# Patient Record
Sex: FEMALE | Race: WHITE | Employment: UNEMPLOYED | URBAN - METROPOLITAN AREA
[De-identification: names, ages, dates, MRNs, and addresses within clinical notes are randomized per-mention and may not be internally consistent; named-entity substitution may affect disease eponyms.]

---
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
## 2025-04-17 NOTE — PROGRESS NOTES
[facility] Initial Evaluation  Subjective:  The history is provided by the patient and a parent.   Filemon Gardner is a 18 year old female with a left knee condition.  Condition occurred with:  Other reason.  Condition occurred: at home.  This is a chronic condition     Patient reports pain:  In the joint, posterior and anterior.    Pain is described as sharp and stabbing and is constant and reported as 4/10 and 8/10.  Associated symptoms:  Loss of motion/stiffness and loss of strength. Pain is the same all the time.  Symptoms are exacerbated by standing, walking and running and relieved by rest.  Since onset symptoms are gradually worsening.    Previous treatment includes physical therapy.  There was mild improvement following previous treatment.    Pertinent medical history includes:  Depression and mental illness.      Current medications:  Anti-depressants.  Current occupation is Student .        Barriers include:  None as reported by the patient.    Red flags:  None as reported by the patient.                        Objective:  System         Lumbar/SI Evaluation                      SI joint/Sacrum:    Bilat SI dysf, Rt>left,     Left positive at:    [employer]ed[employer]  Right positive at:    [employer]  Sacral conclusion left:  Inflare  Sacral conclusion right:  Posterior inominate, locked, upslip and outflare                                  Hip Evaluation      Hip Special Testing:   Special tests hip not assessed: hypermobile hips bilat   Left hip positive for the following special tests:  Debra's  Left hip negative for the following special tests:  Francia  Right hip negative for the following special tests:  Francia or Debra's    Hip Palpation:    Left hip tenderness present at:   Greater Trachanter; IT Band; Piriformis; Abductors; Gluteus Medius and Bursa  Right hip tenderness present at:  Abductors and Gluteus Medius       Knee Evaluation:  ROM:  Strength:   Normal  AROM    Hyperextension:  Left:  3    Right: 3      PROM    Hyperextension: Left: 3    Right:  3            Ligament Testing:    Varus 0:  Left:  Gr I and Neg   Right:  Gr I and Neg  Varus 30:  Left:  Gr I and Neg  Right:  Gr I and Neg      Anterior Drawer:  Left:  Gr I and Neg    Right:  Gr I and Neg  Posterior Drawer: Left:  Neg  Right:  Neg    Special Tests:   Left knee positive for the following special tests:  Debra's and IT Band Friction  Left knee negative for the following special tests:  Patellar compression    Right knee negative for the following special tests:  Patellar Compression; Debra's and IT Band Friction  Palpation:  Palpation of knee: infrapatellar bursae left.  Left knee tenderness present at:  Patellar Tendon; IT Band and Patellar Inferior              General     ROS    Assessment/Plan:    Patient is a 18 year old female with sacral, left side hip and left side knee complaints.    Patient has the following significant findings with corresponding treatment plan.                Diagnosis 1:  Patellar femoral pain left knee   Pain -  hot/cold therapy, US, manual therapy, splint/taping/bracing/orthotics and home program  Inflammation - cold therapy and US  Impaired muscle performance - neuro re-education and home program  Decreased function - therapeutic activities and home program  Instability -  Therapeutic Activity  Therapeutic Exercise  home program  Diagnosis 2:  ITband syndrome/SI dysfunction bilat    Pain -  hot/cold therapy, US, manual therapy, splint/taping/bracing/orthotics, self management and home program  Inflammation - cold therapy and US  Impaired muscle performance - neuro re-education and home program  Decreased function - therapeutic activities and home program  Instability -  Therapeutic Activity  Therapeutic Exercise  home program  Therapy Evaluation Codes:   1) History comprised of:   Personal factors that impact the plan of care:      Age, Coping style, Overall behavior  pattern and Past/current experiences.    Comorbidity factors that impact the plan of care are:      Depression and Mental illness.     Medications impacting care: Anti-depressant and Pain.  2) Examination of Body Systems comprised of:   Body structures and functions that impact the plan of care:      Hip, Knee and Sacral illiac joint.   Activity limitations that impact the plan of care are:      Running, Sports, Squatting/kneeling, Stairs, Standing and Walking.  3) Clinical presentation characteristics are:   Stable/Uncomplicated.  4) Decision-Making    Low complexity using standardized patient assessment instrument and/or measureable assessment of functional outcome.  Cumulative Therapy Evaluation is: Low complexity.    Previous and current functional limitations:  (See Goal Flow Sheet for this information)    Short term and Long term goals: (See Goal Flow Sheet for this information)     Communication ability:  Patient appears to be able to clearly communicate and understand verbal and written communication and follow directions correctly.  Treatment Explanation - The following has been discussed with the patient:   RX ordered/plan of care  Anticipated outcomes  Possible risks and side effects  This patient would benefit from PT intervention to resume normal activities.   Rehab potential is good.    Frequency:  1 X week, once daily  Duration:  for 8 weeks  Discharge Plan:  Achieve all LTG.  Independent in home treatment program.  Reach maximal therapeutic benefit.    Please refer to the daily flowsheet for treatment today, total treatment time and time spent performing 1:1 timed codes.        [Initial] : an initial consultation for [FreeTextEntry2] : annual